# Patient Record
Sex: FEMALE | Race: WHITE | NOT HISPANIC OR LATINO
[De-identification: names, ages, dates, MRNs, and addresses within clinical notes are randomized per-mention and may not be internally consistent; named-entity substitution may affect disease eponyms.]

---

## 2019-09-20 ENCOUNTER — TRANSCRIPTION ENCOUNTER (OUTPATIENT)
Age: 49
End: 2019-09-20

## 2019-09-20 ENCOUNTER — APPOINTMENT (OUTPATIENT)
Dept: OTOLARYNGOLOGY | Facility: CLINIC | Age: 49
End: 2019-09-20
Payer: COMMERCIAL

## 2019-09-20 VITALS
HEIGHT: 63 IN | BODY MASS INDEX: 23.04 KG/M2 | SYSTOLIC BLOOD PRESSURE: 122 MMHG | WEIGHT: 130 LBS | DIASTOLIC BLOOD PRESSURE: 68 MMHG | HEART RATE: 59 BPM

## 2019-09-20 DIAGNOSIS — Z87.09 PERSONAL HISTORY OF OTHER DISEASES OF THE RESPIRATORY SYSTEM: ICD-10-CM

## 2019-09-20 DIAGNOSIS — Z91.09 OTHER ALLERGY STATUS, OTHER THAN TO DRUGS AND BIOLOGICAL SUBSTANCES: ICD-10-CM

## 2019-09-20 DIAGNOSIS — Z78.9 OTHER SPECIFIED HEALTH STATUS: ICD-10-CM

## 2019-09-20 PROBLEM — Z00.00 ENCOUNTER FOR PREVENTIVE HEALTH EXAMINATION: Status: ACTIVE | Noted: 2019-09-20

## 2019-09-20 PROCEDURE — 99213 OFFICE O/P EST LOW 20 MIN: CPT

## 2019-09-20 PROCEDURE — 92557 COMPREHENSIVE HEARING TEST: CPT

## 2019-09-20 PROCEDURE — 92567 TYMPANOMETRY: CPT

## 2019-09-20 RX ORDER — BUDESONIDE AND FORMOTEROL FUMARATE DIHYDRATE 160; 4.5 UG/1; UG/1
AEROSOL RESPIRATORY (INHALATION)
Refills: 0 | Status: ACTIVE | COMMUNITY

## 2019-09-20 NOTE — HISTORY OF PRESENT ILLNESS
[de-identified] : LU NELSON is a 48 year patient Right-sided tinnitus and hearing loss since September 17. She was walking on the street when she developed symptoms. The symptoms have not improved. She has no otalgia, otorrhea, or dizziness. She was not sick and does not have nasal congestion. She does have a headache. She has not been flying.  She denies a history of recurrent ear infections, prior otologic surgery, ear/head trauma, or noise exposure. She has TMJD and uses a nightguard. 
Yes

## 2019-09-20 NOTE — ASSESSMENT
[FreeTextEntry1] : She had a sudden right sensorineural hearing loss which occurred on September 17.\par \par PLAN\par \par -findings and management options discussed with the patient. \par - the possible etiologies of a sudden sensorineural hearing loss were reviewed with the patient. \par - Good aural hygiene.\par - Options for treatment were discussed in detail with the patient. The options are observation, oral steroids, and intratympanic steroid injection. The pros and cons of each were reviewed. Some of associated risks of oral steroids were reviewed with the patient including the risk of fractures, mood changes and GI complications.  She is going to try the oral steroids. I asked her to take the medication with food. I also asked her to consider taking Pepcid while on it to protect her stomach\par -I also discussed possible hyperbaric oxygen therapy which may be helpful per the recent guidelines. If she is interested in trying this since she is within the first 2 weeks of the hearing loss, she may. It is performed at [facility]\par - The patient will be sent for an MRI with contrast of the IACs to rule out retrocochlear lesion and other possible etiologies.  She denies risk factors for Lyme disease or syphilis.\par - Repeat audiogram in one week\par - follow up in 1 week. call and return earlier if any concerns\par

## 2019-09-26 ENCOUNTER — APPOINTMENT (OUTPATIENT)
Dept: OTOLARYNGOLOGY | Facility: CLINIC | Age: 49
End: 2019-09-26
Payer: COMMERCIAL

## 2019-09-26 PROCEDURE — 92557 COMPREHENSIVE HEARING TEST: CPT

## 2019-09-26 PROCEDURE — 99213 OFFICE O/P EST LOW 20 MIN: CPT

## 2019-09-26 PROCEDURE — 92550 TYMPANOMETRY & REFLEX THRESH: CPT

## 2019-09-26 NOTE — HISTORY OF PRESENT ILLNESS
[de-identified] : 9/20/19- LU NELSON is a 48 year patient Right-sided tinnitus and hearing loss since September 17. She was walking on the street when she developed symptoms. The symptoms have not improved. She has no otalgia, otorrhea, or dizziness. She was not sick and does not have nasal congestion. She does have a headache. She has not been flying.  She denies a history of recurrent ear infections, prior otologic surgery, ear/head trauma, or noise exposure. She has TMJD and uses a nightguard.  [FreeTextEntry1] : \par 9/26/19- LU NELSON is here for follow up for a sudden right sensorineural hearing loss which occurred on 9/17. She has been on oral steroids.  Her hearing has improved. The tinnitus is also better.  She has the MRI scheduled for tomorrow.

## 2019-09-26 NOTE — ASSESSMENT
[FreeTextEntry1] : She had a sudden sensorineural hearing loss which occurred on September 17. The hearing loss has resolved. Audiogram showed no asymmetry.\par \par PLAN\par \par -findings and management options discussed with the patient. \par - Good aural hygiene.\par - she will taper the oral steroids. \par - she is going to check with the hyperbaric oxygen facility to see if she should proceed with evaluation for therapy since her hearing has recovered. \par - MRI with contrast of the IACs to rule out retrocochlear lesion pending. she will call for the results. \par - Repeat audiogram in one month\par - follow up in 1 month. call and return earlier if any concerns\par

## 2019-09-27 ENCOUNTER — APPOINTMENT (OUTPATIENT)
Dept: MRI IMAGING | Facility: CLINIC | Age: 49
End: 2019-09-27

## 2019-10-03 ENCOUNTER — FORM ENCOUNTER (OUTPATIENT)
Age: 49
End: 2019-10-03

## 2019-10-04 ENCOUNTER — APPOINTMENT (OUTPATIENT)
Dept: MRI IMAGING | Facility: CLINIC | Age: 49
End: 2019-10-04
Payer: COMMERCIAL

## 2019-10-04 ENCOUNTER — OUTPATIENT (OUTPATIENT)
Dept: OUTPATIENT SERVICES | Facility: HOSPITAL | Age: 49
LOS: 1 days | End: 2019-10-04

## 2019-10-04 PROCEDURE — 70553 MRI BRAIN STEM W/O & W/DYE: CPT | Mod: 26

## 2019-10-17 ENCOUNTER — APPOINTMENT (OUTPATIENT)
Dept: NEUROSURGERY | Facility: CLINIC | Age: 49
End: 2019-10-17
Payer: COMMERCIAL

## 2019-10-17 VITALS
HEART RATE: 81 BPM | HEIGHT: 63 IN | WEIGHT: 130 LBS | DIASTOLIC BLOOD PRESSURE: 68 MMHG | OXYGEN SATURATION: 100 % | RESPIRATION RATE: 18 BRPM | SYSTOLIC BLOOD PRESSURE: 130 MMHG | BODY MASS INDEX: 23.04 KG/M2

## 2019-10-17 PROCEDURE — 99244 OFF/OP CNSLTJ NEW/EST MOD 40: CPT

## 2019-10-21 ENCOUNTER — APPOINTMENT (OUTPATIENT)
Dept: RADIATION ONCOLOGY | Facility: CLINIC | Age: 49
End: 2019-10-21
Payer: COMMERCIAL

## 2019-10-21 ENCOUNTER — APPOINTMENT (OUTPATIENT)
Dept: NEUROSURGERY | Facility: CLINIC | Age: 49
End: 2019-10-21
Payer: COMMERCIAL

## 2019-10-21 VITALS
SYSTOLIC BLOOD PRESSURE: 130 MMHG | HEART RATE: 60 BPM | BODY MASS INDEX: 23.74 KG/M2 | WEIGHT: 134 LBS | OXYGEN SATURATION: 100 % | RESPIRATION RATE: 18 BRPM | DIASTOLIC BLOOD PRESSURE: 72 MMHG

## 2019-10-21 VITALS
SYSTOLIC BLOOD PRESSURE: 104 MMHG | DIASTOLIC BLOOD PRESSURE: 70 MMHG | HEIGHT: 63 IN | OXYGEN SATURATION: 100 % | RESPIRATION RATE: 18 BRPM | HEART RATE: 68 BPM | BODY MASS INDEX: 23.04 KG/M2 | WEIGHT: 130 LBS

## 2019-10-21 PROCEDURE — 99215 OFFICE O/P EST HI 40 MIN: CPT

## 2019-10-21 PROCEDURE — 99204 OFFICE O/P NEW MOD 45 MIN: CPT | Mod: 25

## 2019-10-21 RX ORDER — PREDNISONE 10 MG/1
10 TABLET ORAL
Qty: 62 | Refills: 0 | Status: DISCONTINUED | COMMUNITY
Start: 2019-09-20 | End: 2019-10-21

## 2019-10-21 NOTE — REVIEW OF SYSTEMS
[Loss of Hearing] : loss of hearing [Hearing Disturbances] : hearing disturbances [Negative] : Respiratory [Tinnitus: Grade 1 - Mild symptoms; intervention not indicated] : Tinnitus: Grade 1 - Mild symptoms; intervention not indicated [FreeTextEntry4] : right hearing loss

## 2019-10-21 NOTE — PHYSICAL EXAM
[General Appearance - Alert] : alert [General Appearance - In No Acute Distress] : in no acute distress [Cranial Nerves Oculomotor (III)] : extraocular motion intact [Cranial Nerves Optic (II)] : visual acuity intact bilaterally,  pupils equal round and reactive to light [Cranial Nerves Trigeminal (V)] : facial sensation intact symmetrically [Cranial Nerves Facial (VII)] : face symmetrical [Cranial Nerves Vestibulocochlear (VIII)] : hearing was intact bilaterally [Cranial Nerves Glossopharyngeal (IX)] : tongue and palate midline [Cranial Nerves Hypoglossal (XII)] : there was no tongue deviation with protrusion [Cranial Nerves Accessory (XI - Cranial And Spinal)] : head turning and shoulder shrug symmetric [Motor Tone] : muscle tone was normal in all four extremities [Motor Strength] : muscle strength was normal in all four extremities [Sclera] : the sclera and conjunctiva were normal [Outer Ear] : the ears and nose were normal in appearance [Neck Appearance] : the appearance of the neck was normal [] : no respiratory distress [Respiration, Rhythm And Depth] : normal respiratory rhythm and effort [Heart Rate And Rhythm] : heart rate was normal and rhythm regular [Abnormal Walk] : normal gait [Skin Color & Pigmentation] : normal skin color and pigmentation

## 2019-10-21 NOTE — HISTORY OF PRESENT ILLNESS
[FreeTextEntry1] : Ms Hahn is a 48 year old woman who presents for consideration of radiation therapy for newly diagnosed RIGHT acoustic neuroma.\par \par Patient with no pertinent past medical history. She reports acute RIGHT sided hearing loss and tinnitus and was seen   9/17/19 by ENT Dr. Graciela Mendez. Audiology testing was done which demonstrated significant hearing loss in the RIGHT ear. She was subsequently placed on high dose steroids with interval improvement in hearing. She returned to see Dr. Mendez on 9/26/19, and another audiology test was administered which demonstrated improvement in hearing.\par \par MRI Brain and internal auditory canals  10/4/19 showed\par 1. Enhancing intracanalicular mass lesion in the right internal auditory canal consistent with an \par acoustic neuroma which measures up to 1.0 cm in length and extends to the porus acusticus with no \par extension into the right cerebellar pontine angle. \par 2. No brain parenchymal mass or acute infarct. \par 3. Right frontal lobe developmental venous anomaly.\par  \par She was seen by Dr Wang who referred her to Dr Simmons. She was seen today by Dr Simmons and recommended GKRT.\par Today, she reports occ HA and nausea, right ear ringing and hearing loss for low tones, vertigo with associated impaired balance when in a moving space. She denies visual impairment, pain, difficulty chewing/swallowing. \par

## 2019-10-21 NOTE — VITALS
[Least Pain Intensity: 0/10] : 0/10 [Maximal Pain Intensity: 0/10] : 0/10 [ECOG Performance Status: 0 - Fully active, able to carry on all pre-disease performance without restriction] : Performance Status: 0 - Fully active, able to carry on all pre-disease performance without restriction [90: Able to carry normal activity; minor signs or symptoms of disease.] : 90: Able to carry normal activity; minor signs or symptoms of disease.  [Date: ____________] : Patient's last distress assessment performed on [unfilled].

## 2019-10-21 NOTE — REASON FOR VISIT
[Other: ___] : [unfilled] [Spouse] : spouse [Consideration of Therapy for Benign Disease] : consideration of therapy for benign disease

## 2019-10-21 NOTE — REVIEW OF SYSTEMS
[Vertigo] : vertigo [Dizziness] : dizziness [As Noted in HPI] : as noted in HPI [Loss Of Hearing] : hearing loss [Negative] : Heme/Lymph

## 2019-10-28 ENCOUNTER — OUTPATIENT (OUTPATIENT)
Dept: OUTPATIENT SERVICES | Facility: HOSPITAL | Age: 49
LOS: 1 days | Discharge: HOME | End: 2019-10-28

## 2019-10-29 ENCOUNTER — APPOINTMENT (OUTPATIENT)
Dept: OTOLARYNGOLOGY | Facility: CLINIC | Age: 49
End: 2019-10-29
Payer: COMMERCIAL

## 2019-10-29 DIAGNOSIS — B37.0 CANDIDAL STOMATITIS: ICD-10-CM

## 2019-10-29 PROCEDURE — 99213 OFFICE O/P EST LOW 20 MIN: CPT

## 2019-10-29 NOTE — PHYSICAL EXAM
[General Appearance - Alert] : alert [General Appearance - In No Acute Distress] : in no acute distress [Oriented To Time, Place, And Person] : oriented to person, place, and time [I: Normal Smell] : smell intact bilaterally [Cranial Nerves Optic (II)] : visual acuity intact bilaterally,  pupils equal round and reactive to light [Cranial Nerves Oculomotor (III)] : extraocular motion intact [Cranial Nerves Trigeminal (V)] : facial sensation intact symmetrically [Cranial Nerves Facial (VII)] : face symmetrical [Cranial Nerves Glossopharyngeal (IX)] : tongue and palate midline [Cranial Nerves Accessory (XI - Cranial And Spinal)] : head turning and shoulder shrug symmetric [Cranial Nerves Hypoglossal (XII)] : there was no tongue deviation with protrusion [Sclera] : the sclera and conjunctiva were normal [Outer Ear] : the ears and nose were normal in appearance [] : no respiratory distress [Exaggerated Use Of Accessory Muscles For Inspiration] : no accessory muscle use [Heart Rate And Rhythm] : heart rate was normal and rhythm regular [Abdomen Soft] : soft [Abnormal Walk] : normal gait [Skin Color & Pigmentation] : normal skin color and pigmentation [FreeTextEntry5] : Right sided hearing decline

## 2019-10-29 NOTE — ASSESSMENT
[FreeTextEntry1] : Referral for Gamma Knife evaluation with Dr. Kieran Simmons.\par No open surgical options recommended at this time\par Education provided regarding plan of care.\par

## 2019-10-29 NOTE — ADDENDUM
[FreeTextEntry1] : October 17, 2019\par \par Graciela Mendez MD\par [address]\[place] [address]\par \par Re:	Ute Hahn \par \par Dear Dr. Mendez:\par \par I saw Ute and her  in the office today.  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  While the hearing has improved, some of her dizziness symptoms and just feeling of malaise have not entirely gotten better.  Ute really has no other major medical problems.  She currently maintains herself for asthma on Symbicort, Flonase, and Singulair.  She has had a kidney removed as a child at [facility], as well as spinal surgery, knee surgery, and a hernia repair.  She additionally has had her left knee operated on.  She drinks but does not smoke.  She is  with 2 young children.  Review of systems is notable for dizziness, lightheadedness, some memory difficulties, difficulty walking due to gait, numbness, tingling, and abnormal sensations.  She denies any cardiovascular, respiratory, or GI complaints.  She does report that her hearing has improved quite significantly.  She denies any right-sided facial weakness.\par \par Neurologically, Ute is grossly intact.  She has grossly normal hearing on the right side and has no evidence of facial paresis.  Her eyes movements are intact.  The remainder of her general neurosurgical exam is negative.  I had the opportunity to review her brain MRI from October 4, 2019, that shows an approximately 1 cm x 0.3 cm enhancing mass in the internal auditory canal that is purely within the canal and does not extend into the CP angle.  This is entirely consistent with a small vestibular schwannoma.\par \par I took the opportunity to introduce Ute to Dr. Kieran Simmons here at [facility].  I do think, given her transient hearing loss along with her MRI finding, that early treatment would likely be in her best interest to control the tumor growth and potentially preserve the hearing that she has left. While she could go on to lose hearing, I think the best chance of her preserving hearing in her ear would be to have early treatment albeit with the possibility that she could go on to lose it nonetheless.  I do think treatment is indicated, however.  I think this will give us the best chance of controlling the tumor and avoiding any kind of facial nerve weakness or surgical necessity down the road, and I told her so.  She met with Dr. Simmons briefly and will likely decide in the next few days to weeks whether to go ahead with further treatment.  \par \par I will certainly keep you abreast of her progress and appreciate your kind referral.\par \par Sincerely,\par \par \par \par Kalia Wang MD\par \par DL/ag [facility] #[id number]\par \par CC:	Kristian Raza MD\par 	[address] [place] [address]\[place] 	[place] [address]\par 	\par 	Kieran Simmons MD\par 	[facility]\par 	Department of Neurosurgery\par 	[address], [address] Floor, [facility]\[place] 	[place] [address]\par

## 2019-10-29 NOTE — REVIEW OF SYSTEMS
[Loss Of Hearing] : hearing loss [Negative] : Eyes [FreeTextEntry4] : RIGHT sided HEaring loss (50%)

## 2019-10-29 NOTE — ASSESSMENT
[FreeTextEntry1] : She had a sudden sensorineural hearing loss which occurred on September 17. The hearing loss resolved with steroids. She was found to have a right acoustic neuroma. She is scheduled for gamma knife radiosurgery.   she was found to have thrush on exam today. She does use an inhaler and is on Cipro.\par \par PLAN\par \par -findings and management options discussed with the patient. \par - good aural hygiene\par - monitor hearing- repeat audiogram in one month (earlier if recommended after the procedure)\par - nystatin oral rinses for one week.  she was told to rinse her mouth after using the inhaler. \par - follow up if the thrush does not resolve after treatment. otherwise, follow up in one month. call and return earlier if any concerns\par

## 2019-10-29 NOTE — HISTORY OF PRESENT ILLNESS
[de-identified] : 9/20/19- LU NELSON is a 48 year patient Right-sided tinnitus and hearing loss since September 17. She was walking on the street when she developed symptoms. The symptoms have not improved. She has no otalgia, otorrhea, or dizziness. She was not sick and does not have nasal congestion. She does have a headache. She has not been flying.  She denies a history of recurrent ear infections, prior otologic surgery, ear/head trauma, or noise exposure. She has TMJD and uses a nightguard.  [FreeTextEntry1] : \par 9/26/19- UTE NELSON is here for follow up for a sudden right sensorineural hearing loss which occurred on 9/17. She has been on oral steroids.  Her hearing has improved. The tinnitus is also better.  She has the MRI scheduled for tomorrow. \par \par 10/29/19- Ute Nelson Is here for followup. She had a right sudden sensorineural hearing loss which responded to oral steroids. Workup showed a small intracanicular right vestibular schwannoma. She saw Dr. Wang for neurosurgery evaluation. She was then referred to Dr. Simmons for gamma knife radiosurgery. She has the procedure scheduled for next week.  She said her hearing has been stable. She has mild tinnitus and dysequilibrium. she is on Cipro for UTI.

## 2019-10-29 NOTE — HISTORY OF PRESENT ILLNESS
[FreeTextEntry1] : RIGHT SIDED Hearing loss; Balance difficulty [de-identified] : This is a 48 year old woman here for Comprehensive assessment of Vestibular Schwannoma. She comes to visit with her . She reports thar her symptoms started suddenly on 9/17/19 where she experienced complete right sided hearing loss. She followed up with her ENT Dr. Shahnaz Mendez and was placed on high dose steroids. MRI workup shows vestibular schwanomma. She denies any other neurological deficits

## 2019-10-31 NOTE — PLAN
[FreeTextEntry1] : MRI brain reviewed in detail by Dr. Simmons with patient and patient's , demonstrates RIGHT acoustic neuroma.\par Gamma Knife Radiosurgery recommended. Risks, alternatives and benefits to Gamma Knife Radiosurgery discussed. Risks include but are not limited to cerebral edema, radionecrosis, seizures, continued tumor growth. \par Explained procedure to patient in detail including head frame placement, new MRI the day of procedure, radiation treatment, and post-procedure care. \par Radiation treatment will take place at [address] [place] [address]. Directions and contact information provided to patient.\par Education packet regarding Gamma Knife Radiosurgery provided.\par Multiple questions answered to patient's satisfaction.\par \par Patient understands and wishes to proceed.\par \par She is scheduled for 11/5/19 for GKRS.\par \par Patient and patient's  verbalize agreement and understanding with plan.\par \par \par

## 2019-10-31 NOTE — ADDENDUM
[FreeTextEntry1] : 2019 \par  \par OFFICE CONSULTATION NOTE \par  \par  \par Re:	Ute Hahn  \par :	74 \par MRN:  [id number] \par  \par Ms. Hahn is a 48-year-old woman who presents for consultation regarding a right side vestibular schwannoma. This was discovered in workup for sudden onset hearing loss in September of this year. In addition to the hearing loss, she also had onset of tinnitus, as well as vertigo and nausea. MRI workup for these symptoms disclosed an approximately 1 cm enhancing lesion within the right internal auditory canal consistent with vestibular schwannoma. Ms. Hahn was placed on high dose steroids and some improvement in her hearing on the right was noted. Audiogram demonstrates sensorineural hearing loss on the right side. Aside from that, on neurological exam she is completely nonfocal. \par  \par Strategies for management, including conservative watchful waiting versus Gamma Knife radiosurgery versus microsurgical resection were discussed. Given the symptomatic nature of the vestibular schwannoma, I have recommended she consider treatment with Gamma Knife radiosurgery. Certainly a conservative approach is also reasonable if she were uncomfortable with the treatment risks at this point. I do not recommend microsurgical resection as Gamma Knife can be more easily performed with less morbidity. The goal of Gamma Knife radiosurgery would be to halt or slow tumor progression, and possibly to also halt the progression of hearing loss. Risks of Gamma Knife radiosurgery including progressive hearing loss, continued tumor progression, worsening vestibular symptoms, need for retreatment and continued tumor growth were discussed. Treatment which would be in a single fraction using a headframe was discussed in detail.  \par  \par  \par  \par  \par  \par  \par  \par Ms. Hahn favors active treatment at this point. I think this is reasonable. Will plan to proceed with Gamma Knife in the coming weeks. \par  \par  \par  \par  \par  \par Kieran Simmons M.D. \par  of Neurosurgery \par [facility] \[facility] \par [facility] \par [address] \par [facility], Third Floor \[place] [address] \par Tel: [phone] \par Email:  [email or web address] \par  \par  \par ELSA/francy [facility] #[id number] \par  \par  \par

## 2019-10-31 NOTE — REASON FOR VISIT
[Consultation] : a consultation visit [Referred By: _________] : Patient was referred by JAVIER [Spouse] : spouse [FreeTextEntry1] : RIGHT acoustic neuroma

## 2019-11-01 ENCOUNTER — OUTPATIENT (OUTPATIENT)
Dept: OUTPATIENT SERVICES | Facility: HOSPITAL | Age: 49
LOS: 1 days | Discharge: HOME | End: 2019-11-01
Payer: COMMERCIAL

## 2019-11-04 ENCOUNTER — FORM ENCOUNTER (OUTPATIENT)
Age: 49
End: 2019-11-04

## 2019-11-05 ENCOUNTER — APPOINTMENT (OUTPATIENT)
Dept: RADIATION ONCOLOGY | Facility: CLINIC | Age: 49
End: 2019-11-05
Payer: COMMERCIAL

## 2019-11-05 ENCOUNTER — APPOINTMENT (OUTPATIENT)
Dept: MRI IMAGING | Facility: IMAGING CENTER | Age: 49
End: 2019-11-05

## 2019-11-05 ENCOUNTER — OUTPATIENT (OUTPATIENT)
Dept: OUTPATIENT SERVICES | Facility: HOSPITAL | Age: 49
LOS: 1 days | End: 2019-11-05
Payer: COMMERCIAL

## 2019-11-05 ENCOUNTER — CLINICAL ADVICE (OUTPATIENT)
Age: 49
End: 2019-11-05

## 2019-11-05 ENCOUNTER — APPOINTMENT (OUTPATIENT)
Dept: NEUROSURGERY | Facility: AMBULATORY SURGERY CENTER | Age: 49
End: 2019-11-05
Payer: COMMERCIAL

## 2019-11-05 DIAGNOSIS — D33.3 BENIGN NEOPLASM OF CRANIAL NERVES: ICD-10-CM

## 2019-11-05 PROCEDURE — 76498 UNLISTED MR PROCEDURE: CPT

## 2019-11-05 PROCEDURE — 77432 STEREOTACTIC RADIATION TRMT: CPT

## 2019-11-05 PROCEDURE — 77334 RADIATION TREATMENT AID(S): CPT | Mod: 26

## 2019-11-05 PROCEDURE — 77300 RADIATION THERAPY DOSE PLAN: CPT | Mod: 26

## 2019-11-05 PROCEDURE — 61800 APPLY SRS HEADFRAME ADD-ON: CPT

## 2019-11-05 PROCEDURE — 61798 SRS CRANIAL LESION COMPLEX: CPT

## 2019-11-05 PROCEDURE — 77295 3-D RADIOTHERAPY PLAN: CPT | Mod: 26

## 2019-11-05 PROCEDURE — 99214 OFFICE O/P EST MOD 30 MIN: CPT | Mod: 25

## 2019-11-05 PROCEDURE — A9585: CPT

## 2019-11-05 RX ORDER — MONTELUKAST SODIUM 10 MG/1
10 TABLET, FILM COATED ORAL
Refills: 0 | Status: ACTIVE | COMMUNITY
Start: 2019-11-05

## 2019-11-05 RX ORDER — NYSTATIN 100000 [USP'U]/ML
100000 SUSPENSION ORAL 3 TIMES DAILY
Qty: 105 | Refills: 1 | Status: DISCONTINUED | COMMUNITY
Start: 2019-10-29 | End: 2019-11-05

## 2019-11-05 NOTE — HISTORY OF PRESENT ILLNESS
[FreeTextEntry1] : Ms. Almeida presents today for consideration for radiation therapy. \par \par She notes right sided hearing loss and tinnitus starting in 9/2018. hearing tests showed significant right sided hearing loss, which improved on steroids.\par \par MRI brain IAC done 10/4/19 an enhancing intracanalicular mass lesion in the right IAC consistent with an acoustic neuroma which measures up to 1 cm in length and extend to the porous acusticus with no extension into the right CPA. \par \par Today she notes she has some hearing loss in the right ear, though it is mostly back to normal. She notices trouble with low sounds. Has ringing to the right ear. Notes intermittent facial numbness to the right V1 and V2 distribution. Denies facial weakness. She has an intermittent feeling of nausea and motion sickness.

## 2019-11-05 NOTE — REVIEW OF SYSTEMS
[Loss of Hearing] : loss of hearing [Dizziness] : dizziness [Negative] : Heme/Lymph [Fever] : no fever [Chills] : no chills [Night Sweats] : no night sweats [Dysphagia] : no dysphagia [FreeTextEntry4] : right sided low tone hearing loss [de-identified] : intermittent feeling of numbness to right side of face

## 2019-11-07 DIAGNOSIS — K21.9 GASTRO-ESOPHAGEAL REFLUX DISEASE W/OUT ESOPHAGITIS: ICD-10-CM

## 2019-11-11 ENCOUNTER — APPOINTMENT (OUTPATIENT)
Dept: NEUROSURGERY | Facility: CLINIC | Age: 49
End: 2019-11-11
Payer: COMMERCIAL

## 2019-11-11 VITALS
WEIGHT: 134 LBS | HEART RATE: 83 BPM | RESPIRATION RATE: 18 BRPM | BODY MASS INDEX: 23.74 KG/M2 | OXYGEN SATURATION: 97 % | HEIGHT: 63 IN | DIASTOLIC BLOOD PRESSURE: 76 MMHG | SYSTOLIC BLOOD PRESSURE: 133 MMHG

## 2019-11-11 PROCEDURE — 99024 POSTOP FOLLOW-UP VISIT: CPT

## 2019-11-11 NOTE — HISTORY OF PRESENT ILLNESS
[de-identified] : 48 year old woman with no pertinent past medical history referred by Dr. Kalia Wang for consideration of GKRS to RIGHT acoustic neuroma.\par \par She reports acute RIGHT sided hearing loss on 9/17/19 followed by persistent RIGHT ear tinnitus. She saw ENT, Dr. Graciela Mendez. Audiology testing was done which demonstrated significant hearing loss in the RIGHT ear.  She was subsequently placed on high dose steroids with interval improvement in hearing. She returned to see Dr. Mendez on 9/26/19, and another audiology test was administered which demonstrated improvement in hearing.\par \par Since this episode of acute hearing loss, Ms. Hahn also reports intermittent episodes of vertigo, dizziness and nausea as well as RIGHT sided posterior head "pressure". She states she continues to have persistent RIGHT ear tinnitus, which varies in intensity but feels her RIGHT sided hearing is markedly improved. Denies decreased hearing/tinnitus in LEFT ear.

## 2019-11-11 NOTE — REASON FOR VISIT
[Follow-Up: _____] : a [unfilled] follow-up visit [Spouse] : spouse [FreeTextEntry1] : She comes to the appointment with her .\par \par She reports worse tinnitus beginning last week in the RIGHT ear as well as worsening imbalance. She was started on dexamethasone taper and she states that she does feel better but does continue to have balance trouble.\par \par She denies focal weakness, changes in speech/vision, nausea/vomiting.\par She reports sleep disturbance with increase in steroids.

## 2019-11-11 NOTE — PHYSICAL EXAM
[General Appearance - Alert] : alert [General Appearance - In No Acute Distress] : in no acute distress [Healing Well] : healing well [Oriented To Time, Place, And Person] : oriented to person, place, and time [Motor Tone] : muscle tone was normal in all four extremities [Motor Strength] : muscle strength was normal in all four extremities [Sclera] : the sclera and conjunctiva were normal [Outer Ear] : the ears and nose were normal in appearance [Neck Appearance] : the appearance of the neck was normal [] : no respiratory distress [Respiration, Rhythm And Depth] : normal respiratory rhythm and effort [Heart Rate And Rhythm] : heart rate was normal and rhythm regular [Abnormal Walk] : normal gait [Skin Color & Pigmentation] : normal skin color and pigmentation [FreeTextEntry1] : pin sites

## 2019-12-05 ENCOUNTER — APPOINTMENT (OUTPATIENT)
Dept: OTOLARYNGOLOGY | Facility: CLINIC | Age: 49
End: 2019-12-05
Payer: COMMERCIAL

## 2019-12-05 PROCEDURE — 92557 COMPREHENSIVE HEARING TEST: CPT

## 2019-12-05 PROCEDURE — 92567 TYMPANOMETRY: CPT

## 2019-12-05 PROCEDURE — 99213 OFFICE O/P EST LOW 20 MIN: CPT

## 2019-12-05 RX ORDER — DEXAMETHASONE 6 MG/1
6 TABLET ORAL
Qty: 40 | Refills: 0 | Status: DISCONTINUED | COMMUNITY
Start: 2019-11-07 | End: 2019-12-05

## 2019-12-05 RX ORDER — OMEPRAZOLE 20 MG/1
20 CAPSULE, DELAYED RELEASE ORAL DAILY
Qty: 30 | Refills: 0 | Status: DISCONTINUED | COMMUNITY
Start: 2019-11-07 | End: 2019-12-05

## 2019-12-05 RX ORDER — PREDNISONE 10 MG/1
10 TABLET ORAL
Qty: 30 | Refills: 0 | Status: DISCONTINUED | COMMUNITY
Start: 2019-11-05 | End: 2019-12-05

## 2019-12-05 RX ORDER — DEXAMETHASONE 2 MG/1
2 TABLET ORAL
Qty: 12 | Refills: 0 | Status: DISCONTINUED | COMMUNITY
Start: 2019-11-12 | End: 2019-12-05

## 2019-12-05 NOTE — HISTORY OF PRESENT ILLNESS
[de-identified] :  LU NELSON is a 48 year patient here for followup for a right acoustic neuroma. She developed a sudden sensorineural hearing loss with tinnitus and dizziness on September 17. The hearing loss responded to oral steroids. She underwent gamma knife radiosurgery for the acoustic neuroma on November 5. She said that she had a lot of problems with her balance and dizziness following the procedure. It has been slowly improving. She still has mild fullness and tinnitus in the ear. She is here to check her hearing.

## 2019-12-05 NOTE — ASSESSMENT
[FreeTextEntry1] : She is doing well following gamma knife radiosurgery for a right acoustic normal. She has some issues with her balance and dizziness but it is slowly improving. Her hearing is stable. \par \par PLAN\par \par -findings and management options discussed with the patient. \par - good aural hygiene\par - noise precautions\par - monitor hearing\par - she may try vestibular therapy\par - follow up in 3 months to check her hearing. Return earlier if any worsening symptoms or any concerns. \par

## 2019-12-09 ENCOUNTER — APPOINTMENT (OUTPATIENT)
Dept: NEUROSURGERY | Facility: CLINIC | Age: 49
End: 2019-12-09
Payer: COMMERCIAL

## 2019-12-09 VITALS
SYSTOLIC BLOOD PRESSURE: 107 MMHG | OXYGEN SATURATION: 99 % | HEART RATE: 75 BPM | HEIGHT: 63 IN | BODY MASS INDEX: 23.74 KG/M2 | DIASTOLIC BLOOD PRESSURE: 70 MMHG | WEIGHT: 134 LBS | TEMPERATURE: 98.5 F | RESPIRATION RATE: 18 BRPM

## 2019-12-09 PROCEDURE — 99024 POSTOP FOLLOW-UP VISIT: CPT

## 2019-12-11 NOTE — HISTORY OF PRESENT ILLNESS
[de-identified] : 48 year old woman with no pertinent past medical history referred by Dr. Kalia Wang for consideration of GKRS to RIGHT acoustic neuroma.\par \par She reports acute RIGHT sided hearing loss on 9/17/19 followed by persistent RIGHT ear tinnitus. She saw ENT, Dr. Graciela Mendez. Audiology testing was done which demonstrated significant hearing loss in the RIGHT ear.  She was subsequently placed on high dose steroids with interval improvement in hearing. She returned to see Dr. Mendez on 9/26/19, and another audiology test was administered which demonstrated improvement in hearing.\par \par Since this episode of acute hearing loss, Ms. Hahn also reports intermittent episodes of vertigo, dizziness and nausea as well as RIGHT sided posterior head "pressure". She states she continues to have persistent RIGHT ear tinnitus, which varies in intensity but feels her RIGHT sided hearing is markedly improved. Denies decreased hearing/tinnitus in LEFT ear.

## 2019-12-11 NOTE — ASSESSMENT
[FreeTextEntry1] : Plan:\par - Patient is to follow-up in 3 months.\par - Patient is to obtain a new brain MRI in 6 months (June 2020).\par \par Patient verbalizes understanding and agreement with plan.

## 2020-01-22 ENCOUNTER — EMERGENCY (EMERGENCY)
Facility: HOSPITAL | Age: 50
LOS: 1 days | Discharge: HOME | End: 2020-01-22
Attending: EMERGENCY MEDICINE | Admitting: EMERGENCY MEDICINE
Payer: COMMERCIAL

## 2020-01-22 VITALS
HEART RATE: 65 BPM | SYSTOLIC BLOOD PRESSURE: 122 MMHG | WEIGHT: 134.04 LBS | HEIGHT: 63 IN | OXYGEN SATURATION: 100 % | DIASTOLIC BLOOD PRESSURE: 71 MMHG | TEMPERATURE: 98 F | RESPIRATION RATE: 18 BRPM

## 2020-01-22 VITALS
TEMPERATURE: 99 F | DIASTOLIC BLOOD PRESSURE: 67 MMHG | HEART RATE: 72 BPM | SYSTOLIC BLOOD PRESSURE: 114 MMHG | OXYGEN SATURATION: 100 % | RESPIRATION RATE: 18 BRPM

## 2020-01-22 PROCEDURE — 96375 TX/PRO/DX INJ NEW DRUG ADDON: CPT

## 2020-01-22 PROCEDURE — 80053 COMPREHEN METABOLIC PANEL: CPT

## 2020-01-22 PROCEDURE — 85025 COMPLETE CBC W/AUTO DIFF WBC: CPT

## 2020-01-22 PROCEDURE — 99284 EMERGENCY DEPT VISIT MOD MDM: CPT | Mod: 25

## 2020-01-22 PROCEDURE — 96372 THER/PROPH/DIAG INJ SC/IM: CPT | Mod: XU

## 2020-01-22 PROCEDURE — 70450 CT HEAD/BRAIN W/O DYE: CPT | Mod: 26

## 2020-01-22 PROCEDURE — 70450 CT HEAD/BRAIN W/O DYE: CPT

## 2020-01-22 PROCEDURE — 99284 EMERGENCY DEPT VISIT MOD MDM: CPT

## 2020-01-22 PROCEDURE — 96361 HYDRATE IV INFUSION ADD-ON: CPT

## 2020-01-22 PROCEDURE — 36415 COLL VENOUS BLD VENIPUNCTURE: CPT

## 2020-01-22 PROCEDURE — 99241 OFFICE CONSULTATION NEW/ESTAB PATIENT 15 MIN: CPT

## 2020-01-22 PROCEDURE — 96374 THER/PROPH/DIAG INJ IV PUSH: CPT

## 2020-01-22 RX ORDER — SODIUM CHLORIDE 9 MG/ML
1000 INJECTION INTRAMUSCULAR; INTRAVENOUS; SUBCUTANEOUS ONCE
Refills: 0 | Status: COMPLETED | OUTPATIENT
Start: 2020-01-22 | End: 2020-01-22

## 2020-01-22 RX ORDER — DEXAMETHASONE 0.5 MG/5ML
0 ELIXIR ORAL
Qty: 11 | Refills: 0
Start: 2020-01-22

## 2020-01-22 RX ORDER — SUMATRIPTAN SUCCINATE 4 MG/.5ML
6 INJECTION, SOLUTION SUBCUTANEOUS ONCE
Refills: 0 | Status: COMPLETED | OUTPATIENT
Start: 2020-01-22 | End: 2020-01-22

## 2020-01-22 RX ORDER — DEXAMETHASONE 0.5 MG/5ML
10 ELIXIR ORAL ONCE
Refills: 0 | Status: COMPLETED | OUTPATIENT
Start: 2020-01-22 | End: 2020-01-22

## 2020-01-22 RX ORDER — FAMOTIDINE 10 MG/ML
20 INJECTION INTRAVENOUS ONCE
Refills: 0 | Status: COMPLETED | OUTPATIENT
Start: 2020-01-22 | End: 2020-01-22

## 2020-01-22 RX ORDER — METOCLOPRAMIDE HCL 10 MG
10 TABLET ORAL ONCE
Refills: 0 | Status: COMPLETED | OUTPATIENT
Start: 2020-01-22 | End: 2020-01-22

## 2020-01-22 RX ORDER — KETOROLAC TROMETHAMINE 30 MG/ML
30 SYRINGE (ML) INJECTION ONCE
Refills: 0 | Status: DISCONTINUED | OUTPATIENT
Start: 2020-01-22 | End: 2020-01-22

## 2020-01-22 RX ADMIN — SODIUM CHLORIDE 1000 MILLILITER(S): 9 INJECTION INTRAMUSCULAR; INTRAVENOUS; SUBCUTANEOUS at 15:46

## 2020-01-22 RX ADMIN — Medication 104 MILLIGRAM(S): at 13:50

## 2020-01-22 RX ADMIN — SUMATRIPTAN SUCCINATE 6 MILLIGRAM(S): 4 INJECTION, SOLUTION SUBCUTANEOUS at 14:18

## 2020-01-22 RX ADMIN — SODIUM CHLORIDE 1000 MILLILITER(S): 9 INJECTION INTRAMUSCULAR; INTRAVENOUS; SUBCUTANEOUS at 14:18

## 2020-01-22 RX ADMIN — SODIUM CHLORIDE 1000 MILLILITER(S): 9 INJECTION INTRAMUSCULAR; INTRAVENOUS; SUBCUTANEOUS at 14:46

## 2020-01-22 RX ADMIN — FAMOTIDINE 20 MILLIGRAM(S): 10 INJECTION INTRAVENOUS at 16:39

## 2020-01-22 RX ADMIN — Medication 102 MILLIGRAM(S): at 16:35

## 2020-01-22 RX ADMIN — Medication 10 MILLIGRAM(S): at 13:50

## 2020-01-22 RX ADMIN — SUMATRIPTAN SUCCINATE 6 MILLIGRAM(S): 4 INJECTION, SOLUTION SUBCUTANEOUS at 15:00

## 2020-01-22 RX ADMIN — SODIUM CHLORIDE 1000 MILLILITER(S): 9 INJECTION INTRAMUSCULAR; INTRAVENOUS; SUBCUTANEOUS at 14:45

## 2020-01-22 NOTE — ED PROVIDER NOTE - OBJECTIVE STATEMENT
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 Denies neck stiffness, fever, congestion, numbness, weakness, slurry speech. No menstrual period after gamma knife until last week which ended 2 days ago. Pt is not pregnant. Pt has chronic intermittent vertigo which is not new or worse than usual w/ balance difficulty.

## 2020-01-22 NOTE — ED ADULT NURSE NOTE - CHPI ED NUR SYMPTOMS NEG
no vomiting/no nausea/no numbness/no dizziness/no fever/no weakness/no loss of consciousness/no change in level of consciousness/no confusion

## 2020-01-22 NOTE — ED PROVIDER NOTE - PROGRESS NOTE DETAILS
Decadron IV and then oral taper advised by neurosurgery, will f/u on Fri, discussed emergent return instructions

## 2020-01-22 NOTE — CONSULT NOTE ADULT - SUBJECTIVE AND OBJECTIVE BOX
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 Denies neck stiffness, fever, congestion, numbness, weakness, slurry speech. No menstrual period after gamma knife until last week which ended 2 days ago. Pt is not pregnant. Pt has chronic intermittent vertigo which is not new or worse than usual w/ balance difficulty.  PMH: Acoustic neuroma  Exam: A&O x 3, PERRL, EOMI. CN II to XII are grossly intact.  No pronator drift.  Lung: clear lung sound  Heart: S1S2, regular   Ext: No edma  No focal motor deficit. 5/5 x 4  No sensory deficit to touch.    Head CT: FINDINGS:    VENTRICLES AND SULCI:  Parenchymal volume and sulci are appropriate for the patient's age.  INTRA-AXIAL: No acute intracranial hemorrhage or midline shift is present. The gray-white matter differentiation is preserved.  EXTRA-AXIAL: No extra-axial fluid collection is present. No large or compressive mass lesion is noted in the posterior fossa. Known right IAC schwannoma cannot be seen on head CT.  VISUALIZED SINUSES: The visualized paranasal sinuses are clear.  VISUALIZED MASTOIDS:  Clear.  CALVARIUM:  Normal.  Other: There is asymmetric sclerosis of the left mandibular condylar head which is likely secondary to osteoarthritis as opposed to avascular necrosis.    IMPRESSION:    No acute intracranial hemorrhage or mass effect.  ASSESSMENT: 49 female with headache post Gamma Knife treatment.  Plan: Patient may be discharged home to follow up with Dr. Simmons. She already has an appointment.  - Give 10mg of decadron IV now, then discharge her with decadron taper x 1 week.  Case discussed with Dr. Simmons who reviewed  images and formulated plan.

## 2020-01-22 NOTE — ED PROVIDER NOTE - NEUROLOGICAL, MLM
Alert and oriented, no focal deficits, no motor or sensory deficits. Alert and oriented, no focal deficits, no motor or sensory deficits. CN'II-XII intact, no pronator drift, nl finger to nose, clear speech, gait intact

## 2020-01-22 NOTE — ED PROVIDER NOTE - CLINICAL SUMMARY MEDICAL DECISION MAKING FREE TEXT BOX
48 y/o F with new HA consider recurrent acoustic neuroma. Consider ICH at surgical site but less likely. Suspect more likely migraines secondary to recent hormonal fluctuation as return of menstrual period. Will plan for labs, CT, migraine treatment. Discuses w/ neuro surgery.

## 2020-01-22 NOTE — ED ADULT NURSE NOTE - OBJECTIVE STATEMENT
Pt presents complaining of right sided headache with blurry vision and right sided hearing decrease since wednesday. Pt reports hx of brain mass treated with gamma knife in novmember with initial improvement of symptoms and plan for follow up mri in march. Pt reports tumor was effecting facial nerves and hearing, states these symptoms were improving until wednesday. Pt unable to hear rubbing of fingers next to right ear. Pt reports MD Simmons referred her to [facility] ED for CT scan.

## 2020-01-22 NOTE — ED ADULT TRIAGE NOTE - CHIEF COMPLAINT QUOTE
Patient c/o constant headache mostly on rt side associated with Blurry vision since wednesday . Denies any nausea nor vomiting . History of brain tumor .

## 2020-01-22 NOTE — ED PROVIDER NOTE - PATIENT PORTAL LINK FT
You can access the FollowMyHealth Patient Portal offered by [facility] by registering at the following website: [email or web address]. By joining [facility]’s FollowMyHealth portal, you will also be able to view your health information using other applications (apps) compatible with our system.

## 2020-01-23 ENCOUNTER — APPOINTMENT (OUTPATIENT)
Dept: OTOLARYNGOLOGY | Facility: CLINIC | Age: 50
End: 2020-01-23
Payer: COMMERCIAL

## 2020-01-23 DIAGNOSIS — H92.01 OTALGIA, RIGHT EAR: ICD-10-CM

## 2020-01-23 DIAGNOSIS — H93.11 TINNITUS, RIGHT EAR: ICD-10-CM

## 2020-01-23 DIAGNOSIS — H91.21 SUDDEN IDIOPATHIC HEARING LOSS, RIGHT EAR: ICD-10-CM

## 2020-01-23 PROCEDURE — 99213 OFFICE O/P EST LOW 20 MIN: CPT

## 2020-01-23 NOTE — HISTORY OF PRESENT ILLNESS
[de-identified] : 12/5/19- LU NELSON is a 48 year patient here for followup for a right acoustic neuroma. She developed a sudden sensorineural hearing loss with tinnitus and dizziness on September 17. The hearing loss responded to oral steroids. She underwent gamma knife radiosurgery for the acoustic neuroma on November 5. She said that she had a lot of problems with her balance and dizziness following the procedure. It has been slowly improving. She still has mild fullness and tinnitus in the ear. She is here to check her hearing.\par  [FreeTextEntry1] : \par 1/23/20- Ute Is here for right ear and jaw pain. She said that she felt significant sharp right ear and jaw pain. She then developed a right-sided headache. She has had worsening tinnitus in the right ear. She continues to suffer from dizziness/disequilibrium. She spoke with Dr. Simmons and went to the emergency room. She had CT scan of the head which was unremarkable. She said she did have a recent sinus infection. I reviewed the films. The sinuses were clear on the exam. She has been on oral steroids for the past day. The pain is improving. She was also given another medication in the ER. She does have a history of TMJ dysfunction and uses a .

## 2020-01-23 NOTE — CONSULT LETTER
[Courtesy Letter:] : I had the pleasure of seeing your patient, [unfilled], in my office today. [Dear  ___] : Dear  [unfilled], [Consult Closing:] : Thank you very much for allowing me to participate in the care of this patient.  If you have any questions, please do not hesitate to contact me. [Please see my note below.] : Please see my note below. [FreeTextEntry3] : Graciela Mendez MD\par  [Sincerely,] : Sincerely,

## 2020-01-23 NOTE — ASSESSMENT
[FreeTextEntry1] : She has had right ear and jaw pain. Her ear was normal on exam. She had a CT scan recently as well. There was no sinus inflammation. We discussed possible etiologies of pain. She is going to see Dr. Simmons for evaluation to see this related to her recent treatment for the acoustic neuroma. She is feeling better on the steroids. She also has TMJ dysfunction. It could also cause ear pain as well.\par \par She has dizziness and disequilibrium\par \par PLAN\par \par -findings and management options discussed with the patient. \par - good aural hygiene\par - noise precautions\par - monitor hearing- she declined an audiogram today\par - she may try vestibular therapy\par - continue with treatment for TMJD\par - She will see Dr. Simmons for evaluation for the headache\par - follow up next month as scheduled to check her hearing. She should return earlier if any worsening symptoms or any concerns. \par

## 2020-01-24 ENCOUNTER — APPOINTMENT (OUTPATIENT)
Dept: NEUROSURGERY | Facility: CLINIC | Age: 50
End: 2020-01-24
Payer: COMMERCIAL

## 2020-01-24 VITALS
BODY MASS INDEX: 23.74 KG/M2 | RESPIRATION RATE: 18 BRPM | SYSTOLIC BLOOD PRESSURE: 123 MMHG | HEIGHT: 63 IN | WEIGHT: 134 LBS | OXYGEN SATURATION: 99 % | HEART RATE: 58 BPM | DIASTOLIC BLOOD PRESSURE: 75 MMHG

## 2020-01-24 PROCEDURE — 99024 POSTOP FOLLOW-UP VISIT: CPT

## 2020-01-25 PROBLEM — D33.3 BENIGN NEOPLASM OF CRANIAL NERVES: Chronic | Status: ACTIVE | Noted: 2020-01-22

## 2020-01-30 DIAGNOSIS — R51 HEADACHE: ICD-10-CM

## 2020-01-30 DIAGNOSIS — G43.909 MIGRAINE, UNSPECIFIED, NOT INTRACTABLE, WITHOUT STATUS MIGRAINOSUS: ICD-10-CM

## 2020-02-04 NOTE — PHYSICAL EXAM
[General Appearance - In No Acute Distress] : in no acute distress [Oriented To Time, Place, And Person] : oriented to person, place, and time [General Appearance - Alert] : alert [Cranial Nerves Optic (II)] : visual acuity intact bilaterally,  pupils equal round and reactive to light [Cranial Nerves Trigeminal (V)] : facial sensation intact symmetrically [Cranial Nerves Facial (VII)] : face symmetrical [Cranial Nerves Oculomotor (III)] : extraocular motion intact [Cranial Nerves Glossopharyngeal (IX)] : tongue and palate midline [Cranial Nerves Accessory (XI - Cranial And Spinal)] : head turning and shoulder shrug symmetric [Motor Tone] : muscle tone was normal in all four extremities [Motor Strength] : muscle strength was normal in all four extremities [Cranial Nerves Hypoglossal (XII)] : there was no tongue deviation with protrusion [Sclera] : the sclera and conjunctiva were normal [] : no respiratory distress [Outer Ear] : the ears and nose were normal in appearance [Neck Appearance] : the appearance of the neck was normal [Respiration, Rhythm And Depth] : normal respiratory rhythm and effort [Abnormal Walk] : normal gait [Skin Color & Pigmentation] : normal skin color and pigmentation [FreeTextEntry5] : decreased hearing in RIGHT ear

## 2020-02-04 NOTE — ADDENDUM
[FreeTextEntry1] : 2020 \par  \par OFFICE FOLLOWUP NOTE \par  \par  \par Re:	Ute Hahn  \par :	74 \par MRN:  [id number] \par  \par Ms. Hahn is a 49-year-old woman who underwent Gamma Knife radiosurgery on 2019 for a right side intracanalicular vestibular schwannoma. She comes in today for followup after presenting to the ED last week with severe headaches, as well as increased right sided tinnitus and face pain. I reviewed her head CT scan from the ER visit and it is unremarkable, no pathology noted. Today she tells me that she has been on a dexamethasone steroid taper and it has been helping to alleviate most of her symptoms. She feels that she is definitely getting better. She visited with her ENT doctor yesterday who indicates that there is no identifiable pathology on which to act upon at this time.  \par  \par At this point I would like her to continue with the dexamethasone taper. I have recommended that she consider vestibular rehab/physical therapy, and I would like to see her again in one month. I reassured her that the transient symptoms that she finds occurring can certainly be seen after radiosurgery to intracanalicular vestibular schwannomas such as hers, and I suspect it may be at least partially due to irritation within the inner ear. She will alert me if there are any new or persistent issues. Otherwise I will see her again in one month. \par  \par  \par  \par  \par Kieran Simmons M.D. \[facility] \[facility] \[facility] \[facility] \par [address] \[facility], Third [place] \[place] [address] \[facility] Tel: [phone] \[facility] Email:  [email or web address] \par  \par  \par ELSA/francy [facility] #[id number] \par  \[facility]  \[facility]

## 2020-02-04 NOTE — REASON FOR VISIT
[Spouse] : spouse [Follow-Up: _____] : a [unfilled] follow-up visit [FreeTextEntry1] : \par TODAY'S VISIT:\par Following up today due to new symptoms - intermittent dizziness, RIGHT sided pressure like headache and imbalance.\casa Was seen in ED for headache and CT head was done which was negative for acute abnormality.\par At today's visit, she states headache has resolved but reports persistent ear fullness in the RIGHT, dizziness, imbalance. She has been able to return to work.\par She is currently taking 6 mg dexamethasone daily.\par She saw ENT yesterday, Dr. Mendez and was recommended vestibular therapy.\par \par PREVIOUS OFFICE VISIT 12/9/19:\par Returns today to evaluate progress.\par She finished steroid taper and symptoms have significantly improved. She has returned to work. \par \par PREVIOUS OFFICE VISIT 11/11/19:\par She comes to the appointment with her .\par \par She reports worse tinnitus beginning last week in the RIGHT ear as well as worsening imbalance. She was started on dexamethasone taper and she states that she does feel better but does continue to have balance trouble.\par \par She denies focal weakness, changes in speech/vision, nausea/vomiting.\par She reports sleep disturbance with increase in steroids.

## 2020-02-04 NOTE — ASSESSMENT
[FreeTextEntry1] : CT head done in ED reviewed by Dr. Simmons with patient.\par Recommend dexamethasone taper (patient was not written for taper by ED - will rewrite dexamethasone taper over 7 days). Instructed to take with Pepcid daily and to take steroids with food.\par Recommend vestibular therapy for persistent dizziness/imbalance - Rx provided.\par Return in one month to evaluate progress.\par Instructions regarding steroid taper written down. Teachback method implemented.\par \par Education provided regarding plan of care. \par Patient verbalizes agreement and understanding with plan.

## 2020-02-04 NOTE — HISTORY OF PRESENT ILLNESS
[de-identified] : 48 year old woman with no pertinent past medical history referred by Dr. Kalia Wang for consideration of GKRS to RIGHT acoustic neuroma.\par \par She reports acute RIGHT sided hearing loss on 9/17/19 followed by persistent RIGHT ear tinnitus. She saw ENT, Dr. Graciela Mendez. Audiology testing was done which demonstrated significant hearing loss in the RIGHT ear.  She was subsequently placed on high dose steroids with interval improvement in hearing. She returned to see Dr. Mendez on 9/26/19, and another audiology test was administered which demonstrated improvement in hearing.\par \par Since this episode of acute hearing loss, Ms. Hahn also reports intermittent episodes of vertigo, dizziness and nausea as well as RIGHT sided posterior head "pressure". She states she continues to have persistent RIGHT ear tinnitus, which varies in intensity but feels her RIGHT sided hearing is markedly improved. Denies decreased hearing/tinnitus in LEFT ear.

## 2020-02-24 ENCOUNTER — APPOINTMENT (OUTPATIENT)
Dept: NEUROSURGERY | Facility: CLINIC | Age: 50
End: 2020-02-24

## 2020-03-05 ENCOUNTER — APPOINTMENT (OUTPATIENT)
Dept: OTOLARYNGOLOGY | Facility: CLINIC | Age: 50
End: 2020-03-05

## 2020-03-17 NOTE — REASON FOR VISIT
[FreeTextEntry1] : \par TODAY'S VISIT:\par Returns for follow up after completing steroid taper to re-evaluate symptoms.\par \par PREVIOUS VISIT:\par Following up today due to new symptoms - intermittent dizziness, RIGHT sided pressure like headache and imbalance.\par Was seen in ED for headache and CT head was done which was negative for acute abnormality.\par At today's visit, she states headache has resolved but reports persistent ear fullness in the RIGHT, dizziness, imbalance. She has been able to return to work.\par She is currently taking 6 mg dexamethasone daily.\par She saw ENT yesterday, Dr. Mendez and was recommended vestibular therapy.\par \par \par PREVIOUS OFFICE VISIT 12/9/19:\par Returns today to evaluate progress.\par She finished steroid taper and symptoms have significantly improved. She has returned to work. \par \par

## 2020-03-17 NOTE — HISTORY OF PRESENT ILLNESS
[de-identified] : 48 year old woman with no pertinent past medical history referred by Dr. Kalia Wang for consideration of GKRS to RIGHT acoustic neuroma.\par \par She reports acute RIGHT sided hearing loss on 9/17/19 followed by persistent RIGHT ear tinnitus. She saw ENT, Dr. Graciela Mendez. Audiology testing was done which demonstrated significant hearing loss in the RIGHT ear.  She was subsequently placed on high dose steroids with interval improvement in hearing. She returned to see Dr. Mendez on 9/26/19, and another audiology test was administered which demonstrated improvement in hearing.\par \par Since this episode of acute hearing loss, Ms. Hahn also reports intermittent episodes of vertigo, dizziness and nausea as well as RIGHT sided posterior head "pressure". She states she continues to have persistent RIGHT ear tinnitus, which varies in intensity but feels her RIGHT sided hearing is markedly improved. Denies decreased hearing/tinnitus in LEFT ear.

## 2020-03-20 ENCOUNTER — APPOINTMENT (OUTPATIENT)
Dept: NEUROSURGERY | Facility: CLINIC | Age: 50
End: 2020-03-20

## 2020-06-22 ENCOUNTER — APPOINTMENT (OUTPATIENT)
Dept: NEUROSURGERY | Facility: CLINIC | Age: 50
End: 2020-06-22
Payer: COMMERCIAL

## 2020-06-22 PROCEDURE — 99215 OFFICE O/P EST HI 40 MIN: CPT | Mod: 95

## 2020-06-22 RX ORDER — DEXAMETHASONE 2 MG/1
2 TABLET ORAL
Qty: 28 | Refills: 0 | Status: DISCONTINUED | COMMUNITY
Start: 2020-01-24 | End: 2020-06-22

## 2020-06-22 RX ORDER — DEXAMETHASONE 6 MG/1
TABLET ORAL
Refills: 0 | Status: DISCONTINUED | COMMUNITY
End: 2020-06-22

## 2020-06-22 RX ORDER — FAMOTIDINE 20 MG/1
20 TABLET, FILM COATED ORAL DAILY
Qty: 14 | Refills: 0 | Status: DISCONTINUED | COMMUNITY
Start: 2020-01-24 | End: 2020-06-22

## 2020-06-22 NOTE — ASSESSMENT
[FreeTextEntry1] : Symptoms are improving but she does report some mild imbalance and intermittent tinnitus.\par Recommend repeat MRI brain and IACs with and without contrast in 6 months - she will obtain MRI in September 2020 and will return to see Dr. Simmons to review imaging.\par \par Education provided regarding disease process and plan of care.\par \par Patient verbalizes agreement and understanding with plan.

## 2020-06-22 NOTE — REVIEW OF SYSTEMS
[As Noted in HPI] : as noted in HPI [Dizziness] : dizziness [Vertigo] : vertigo [Negative] : Heme/Lymph

## 2020-06-22 NOTE — REASON FOR VISIT
[Follow-Up: _____] : a [unfilled] follow-up visit [Spouse] : spouse [FreeTextEntry1] : \par TODAY'S VISIT:\par \par She is due for MRI brain and IACs with and without contrast to evaluate RIGHT vestibular schwannoma s/p GKRS on 11/5/19. Due to COVID 19 pandemic, she has asked to postpone MRI until September 2020.\par \par She comes today for continuation of care.\par \par She reports mild imbalance and intermittent RIGHT ear tinnitus. This has improved since previous visits in January.\par \par PREVIOUS VISIT 1/24/2020:\par Following up today due to new symptoms - intermittent dizziness, RIGHT sided pressure like headache and imbalance.\par Was seen in ED for headache and CT head was done which was negative for acute abnormality.\par At today's visit, she states headache has resolved but reports persistent ear fullness in the RIGHT, dizziness, imbalance. She has been able to return to work.\par She is currently taking 6 mg dexamethasone daily.\par She saw ENT yesterday, Dr. Mendez and was recommended vestibular therapy.\par \par PREVIOUS OFFICE VISIT 12/9/19:\par Returns today to evaluate progress.\par She finished steroid taper and symptoms have significantly improved. She has returned to work. \par \par PREVIOUS OFFICE VISIT 11/11/19:\par She comes to the appointment with her .\par \par She reports worse tinnitus beginning last week in the RIGHT ear as well as worsening imbalance. She was started on dexamethasone taper and she states that she does feel better but does continue to have balance trouble.\par \par She denies focal weakness, changes in speech/vision, nausea/vomiting.\par She reports sleep disturbance with increase in steroids.

## 2020-09-03 ENCOUNTER — OUTPATIENT (OUTPATIENT)
Dept: OUTPATIENT SERVICES | Facility: HOSPITAL | Age: 50
LOS: 1 days | End: 2020-09-03

## 2020-09-03 ENCOUNTER — APPOINTMENT (OUTPATIENT)
Dept: MRI IMAGING | Facility: CLINIC | Age: 50
End: 2020-09-03
Payer: COMMERCIAL

## 2020-09-03 PROCEDURE — 70553 MRI BRAIN STEM W/O & W/DYE: CPT | Mod: 26

## 2020-09-10 ENCOUNTER — APPOINTMENT (OUTPATIENT)
Dept: NEUROSURGERY | Facility: CLINIC | Age: 50
End: 2020-09-10
Payer: COMMERCIAL

## 2020-09-10 PROCEDURE — 99215 OFFICE O/P EST HI 40 MIN: CPT | Mod: 95

## 2020-09-10 NOTE — REASON FOR VISIT
[Follow-Up: _____] : a [unfilled] follow-up visit [Spouse] : spouse [FreeTextEntry1] : \par TODAY'S VISIT:\par \par Returns to review MRI brain with and without contrast to evaluate stability of acoustic neuroma s/p GKRS on 11/5/19.\par \par She reports improvement in dizziness and vestibular symptoms.\par She reports muffled hearing in RIGHT ear and occasional tinnitus but tinnitus has somewhat improved.\par She has returned to work and is able to perform her routine daily activities without difficulty. \par \par PREVIOUS OFFICE VISIT 1/24/2020:\par Following up today due to new symptoms - intermittent dizziness, RIGHT sided pressure like headache and imbalance.\par Was seen in ED for headache and CT head was done which was negative for acute abnormality.\par At today's visit, she states headache has resolved but reports persistent ear fullness in the RIGHT, dizziness, imbalance. She has been able to return to work.\par She is currently taking 6 mg dexamethasone daily.\par She saw ENT yesterday, Dr. Mendez and was recommended vestibular therapy.\par \par PREVIOUS OFFICE VISIT 12/9/19:\par Returns today to evaluate progress.\par She finished steroid taper and symptoms have significantly improved. She has returned to work. \par \par PREVIOUS OFFICE VISIT 11/11/19:\par She comes to the appointment with her .\par \par She reports worse tinnitus beginning last week in the RIGHT ear as well as worsening imbalance. She was started on dexamethasone taper and she states that she does feel better but does continue to have balance trouble.\par \par She denies focal weakness, changes in speech/vision, nausea/vomiting.\par She reports sleep disturbance with increase in steroids.

## 2020-09-10 NOTE — HISTORY OF PRESENT ILLNESS
[Home] : at home, [unfilled] , at the time of the visit. [Medical Office: ([facility])___] : at the medical office located in  [Verbal consent obtained from patient] : the patient, [unfilled] [de-identified] : 48 year old woman with no pertinent past medical history referred by Dr. Kalia Wang for consideration of GKRS to RIGHT acoustic neuroma.\par \par She reports acute RIGHT sided hearing loss on 9/17/19 followed by persistent RIGHT ear tinnitus. She saw ENT, Dr. Graciela Mendez. Audiology testing was done which demonstrated significant hearing loss in the RIGHT ear.  She was subsequently placed on high dose steroids with interval improvement in hearing. She returned to see Dr. Mendez on 9/26/19, and another audiology test was administered which demonstrated improvement in hearing.\par \par Since this episode of acute hearing loss, Ms. Hahn also reports intermittent episodes of vertigo, dizziness and nausea as well as RIGHT sided posterior head "pressure". She states she continues to have persistent RIGHT ear tinnitus, which varies in intensity but feels her RIGHT sided hearing is markedly improved. Denies decreased hearing/tinnitus in LEFT ear.

## 2020-09-10 NOTE — ASSESSMENT
[FreeTextEntry1] : MRI brain with and without contrast from 9/3/2020 reviewed by Dr. Simmons demonstrates RIGHT acoustic neuroma with slight interval increase in size.\par Patient's vestibular symptoms have improved.\par Recommend MRI brain with and without contrast in one year (September 2021). Patient will call in August 2021 to schedule.\par All questions answered to patient's satisfaction.\par \par Patient verbalizes agreement and understanding with plan of care.\par

## 2020-09-10 NOTE — DATA REVIEWED
[de-identified] : EXAM: MR IAC ONLY WAW IC \par \par PROCEDURE DATE: 09/03/2020 \par \par \par \par \par INTERPRETATION: Sagittal, axial and coronal imaging of the brain was performed with attention to the internal auditory canals bilaterally. T1 and T2 weighted sequences were acquired both before and following intravenous contrast administration, including volumetric and fat-suppressed sequences. \par \par Contrast dose: 6 cc of intravenous Gadavist. \par \par Clinical information: Status post gamma knife radiosurgery for right acoustic neuroma of 11/5/2019. \par \par The current study is compared with previous scan dated 10/4/2019. \par \par There is again evident enhancing mass that extends from the right porus acusticus to the IAC fundus most compatible with vestibular schwannoma. The mass currently demonstrates a maximal dimension of 13 mm, previously 11 mm, based on multiplanar and multisequential measurements. A very small component of the mass now extends into the lateral aspect of the cerebellopontine angle cistern, without contact to the brain. There is again abnormal hyperintensity within the cochlea on the FLAIR images, without corresponding contrast enhancement. The ventricles, sulci and cisterns remain normal in caliber for the patient's age without additional intracranial mass or pathologic contrast enhancement. 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 \par \par IMPRESSION: \par \par There has been slight increase in maximal dimension of right vestibular schwannoma, likely within the measurement margin of error. \par \par \par \par \par \par \par COLETTE BHAT M.D., ATTENDING RADIOLOGIST \par This document has been electronically signed. Sep 3 2020 2:14PM

## 2021-06-23 ENCOUNTER — APPOINTMENT (OUTPATIENT)
Dept: OTOLARYNGOLOGY | Facility: CLINIC | Age: 51
End: 2021-06-23
Payer: COMMERCIAL

## 2021-06-23 PROCEDURE — 99212 OFFICE O/P EST SF 10 MIN: CPT

## 2021-06-23 PROCEDURE — 92567 TYMPANOMETRY: CPT

## 2021-06-23 PROCEDURE — 92557 COMPREHENSIVE HEARING TEST: CPT

## 2021-06-23 RX ORDER — FLUTICASONE PROPIONATE 50 MCG
SPRAY, SUSPENSION NASAL
Refills: 0 | Status: DISCONTINUED | COMMUNITY
End: 2021-06-23

## 2021-06-23 NOTE — ASSESSMENT
[FreeTextEntry1] : She has been doing well since treatment for the right acoustic neuroma. She still has some mild dizziness and dysequilibrium. She has had more tinnitus, headaches, and muffled hearing on the right side. It is unclear if this could be related to the acoustic neuroma, treatment for the acoustic neuroma, or other sources such as TMJ dysfunction or possibly migraines. Audiogram showed a mild sensorineural hearing loss on the right side. \par \par PLAN\par \par -findings and management options discussed with the patient. \par - good aural hygiene\par - noise precautions\par - repeat audiogram in 6 months\par - vestibular therapy as needed. \par - continue with treatment for TMJD\par - I asked her to followup with Dr. Simmons regarding her current symptoms. She is due for repeat MRI in the fall. \par - I asked her to call me and give me an update on her condition. If she is doing well, I will see her back in 6 months\par - Call or return earlier if any worsening symptoms or concerns\par

## 2021-06-23 NOTE — HISTORY OF PRESENT ILLNESS
[de-identified] : LU NELSON is a 50 year patient who underwent gamma knife radiosurgery for a right acoustic neuroma on 11/5/19. She has noticed tinnitus, headaches, and muffled hearing on the right side. She feels that changes in the weather make her symptoms worse. She has no otorrhea. The dizziness and balance issues have improved although she still has some trouble. She does suffer from TMJ dysfunction. She uses a nightguard.\par \par ENT History\par She had gamma knife radiosurgery 11/5/19. \par MRI 9/2020- slight increase in maximal dimension of right vestibular schwannoma, likely within the margin of error\par MRI 10/4/19- 1.0 x 0.3 cm right acoustic neuroma. \par She had TMJD and uses a nightguard\par CT scan head 1/2020- unremarkable

## 2021-09-01 ENCOUNTER — APPOINTMENT (OUTPATIENT)
Dept: MRI IMAGING | Facility: CLINIC | Age: 51
End: 2021-09-01
Payer: COMMERCIAL

## 2021-09-01 ENCOUNTER — RESULT REVIEW (OUTPATIENT)
Age: 51
End: 2021-09-01

## 2021-09-01 ENCOUNTER — OUTPATIENT (OUTPATIENT)
Dept: OUTPATIENT SERVICES | Facility: HOSPITAL | Age: 51
LOS: 1 days | End: 2021-09-01

## 2021-09-01 PROCEDURE — 70553 MRI BRAIN STEM W/O & W/DYE: CPT | Mod: 26

## 2021-09-07 ENCOUNTER — APPOINTMENT (OUTPATIENT)
Dept: NEUROSURGERY | Facility: CLINIC | Age: 51
End: 2021-09-07
Payer: COMMERCIAL

## 2021-09-07 PROCEDURE — 99214 OFFICE O/P EST MOD 30 MIN: CPT | Mod: 95

## 2021-09-08 NOTE — DATA REVIEWED
[de-identified] : \par  MR IAC w/wo IV Cont             Final\par \par No Documents Attached\par \par \par \par \par   EXAM:  MR IAC ONLY [facility] IC\par \par PROCEDURE DATE:  09/01/2021\par \par \par \par \par INTERPRETATION:  Sagittal, axial and coronal imaging of the brain was performed with attention to the internal auditory canals bilaterally.  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 Evaluate stability.\par \par The current study is compared with previous scan dated 9/3/2020.\par \par There has been slight interval increase in volume of right vestibular schwannoma most evident along its medial margin where there is now a small spherical component with a diameter of approximate 5 mm that projects into the lateral aspect of the CP angle cistern. Overall length of the tumor has increased from 13 to 14 mm. There remains no evidence of brainstem or cerebellar compression. There remains no evidence of pathologic enhancement within the membranous labyrinth, with persistent elevated FLAIR signal compatible with elevated protein content.\par \par There is again evidence of a right posterior frontal developmental venous anomaly. There remains no evidence of additional intracranial mass or pathologic contrast enhancement. There are again scattered foci of T2 shortening in the cerebral white matter that are nonspecific. Paranasal sinuses and mastoids remain clear.\par \par IMPRESSION:\par \par Slight interval increase in volume of tumor in the lateral right cerebellopontine angle cistern, though there remains no evidence of brainstem or cerebellar compression.\par \par --- End of Report ---\par \par \par \par \par \par \par COLETTE BHAT MD; Attending Radiologist\par This document has been electronically signed. Sep  5 2021 10:21AM\par \par  \par \par  Ordered by: EUGENE GONZALEZ       Collected/Examined: 01Sep2021 05:03PM       \par Verification Required       Stage: Final       \par  Performed at: [facility]       Resulted: [id number] 10:04AM       Last Updated: 05Sep2021 10:25AM       Accession: [id number]

## 2021-09-08 NOTE — ASSESSMENT
[FreeTextEntry1] : MRI brain/IACs with and without contrast from 9/1/21 reviewed by Dr. Simmons with patient demonstrates slight increase in right CPA mass.\par Recommend repeat MRI brain in 6 months (January/February 2022). patient will call in January 2022 to schedule.\par \par Patient verbalizes agreement and understanding with plan of care.\par \par I, Dr. Simmons, personally performed the evaluation and management (E/M) services for this established patient who presents today with (a) new problem(s)/exacerbation of (an) existing condition(s).  That E/M includes conducting the examination, assessing all new/exacerbated conditions, and establishing a new plan of care.  Today, my ACP, Belle Dsouza, was here to observe my evaluation and management services for this new problem/exacerbated condition to be followed going forward.\par \par

## 2021-09-08 NOTE — HISTORY OF PRESENT ILLNESS
[Home] : at home, [unfilled] , at the time of the visit. [Medical Office: ([facility])___] : at the medical office located in  [Verbal consent obtained from patient] : the patient, [unfilled] [de-identified] : 48 year old woman with no pertinent past medical history referred by Dr. Kalia Wang for consideration of GKRS to RIGHT acoustic neuroma.\par \par She reports acute RIGHT sided hearing loss on 9/17/19 followed by persistent RIGHT ear tinnitus. She saw ENT, Dr. Graciela Mendez. Audiology testing was done which demonstrated significant hearing loss in the RIGHT ear.  She was subsequently placed on high dose steroids with interval improvement in hearing. She returned to see Dr. Mendez on 9/26/19, and another audiology test was administered which demonstrated improvement in hearing.\par \par Since this episode of acute hearing loss, Ms. Hahn also reports intermittent episodes of vertigo, dizziness and nausea as well as RIGHT sided posterior head "pressure". She states she continues to have persistent RIGHT ear tinnitus, which varies in intensity but feels her RIGHT sided hearing is markedly improved. Denies decreased hearing/tinnitus in LEFT ear.\par \par She is s/p GKRS to RIGHT vestibular schwannoma on 11/5/19.\par \par MRI brain with and without contrast on 9/1/20 to evaluate stability of acoustic neuroma s/p GKRS on 11/5/19.\par \par She was recommended MRI brain in one year and had MRI brain done on 9/1/21, which she returns today to review. \par \par She reports intermittent dizziness, decreased hearing and tinnitus triggered by hot weather.

## 2021-09-27 ENCOUNTER — TRANSCRIPTION ENCOUNTER (OUTPATIENT)
Age: 51
End: 2021-09-27

## 2021-12-16 ENCOUNTER — APPOINTMENT (OUTPATIENT)
Dept: OTOLARYNGOLOGY | Facility: CLINIC | Age: 51
End: 2021-12-16

## 2022-03-10 PROBLEM — R26.89 IMBALANCE: Status: ACTIVE | Noted: 2020-01-24

## 2022-03-14 ENCOUNTER — APPOINTMENT (OUTPATIENT)
Dept: NEUROSURGERY | Facility: CLINIC | Age: 52
End: 2022-03-14
Payer: COMMERCIAL

## 2022-03-14 DIAGNOSIS — R26.89 OTHER ABNORMALITIES OF GAIT AND MOBILITY: ICD-10-CM

## 2022-03-14 PROCEDURE — 99215 OFFICE O/P EST HI 40 MIN: CPT | Mod: 95

## 2022-03-14 NOTE — PHYSICAL EXAM
[General Appearance - Alert] : alert [General Appearance - In No Acute Distress] : in no acute distress [General Appearance - Well-Appearing] : healthy appearing [Oriented To Time, Place, And Person] : oriented to person, place, and time [Impaired Insight] : insight and judgment were intact [Affect] : the affect was normal [Memory Recent] : recent memory was not impaired [Neck Appearance] : the appearance of the neck was normal [] : no respiratory distress [Respiration, Rhythm And Depth] : normal respiratory rhythm and effort [Skin Color & Pigmentation] : normal skin color and pigmentation

## 2022-03-17 ENCOUNTER — APPOINTMENT (OUTPATIENT)
Dept: OTOLARYNGOLOGY | Facility: CLINIC | Age: 52
End: 2022-03-17
Payer: COMMERCIAL

## 2022-03-17 VITALS — TEMPERATURE: 97.3 F | BODY MASS INDEX: 24.8 KG/M2 | WEIGHT: 140 LBS | HEIGHT: 63 IN

## 2022-03-17 DIAGNOSIS — M26.609 UNSPECIFIED TEMPOROMANDIBULAR JOINT DISORDER: ICD-10-CM

## 2022-03-17 DIAGNOSIS — H90.41 SENSORINEURAL HEARING LOSS, UNILATERAL, RIGHT EAR, WITH UNRESTRICTED HEARING ON THE CONTRALATERAL SIDE: ICD-10-CM

## 2022-03-17 DIAGNOSIS — H93.291 OTHER ABNORMAL AUDITORY PERCEPTIONS, RIGHT EAR: ICD-10-CM

## 2022-03-17 PROCEDURE — 92557 COMPREHENSIVE HEARING TEST: CPT

## 2022-03-17 PROCEDURE — 99213 OFFICE O/P EST LOW 20 MIN: CPT

## 2022-03-17 PROCEDURE — 92567 TYMPANOMETRY: CPT

## 2022-03-17 NOTE — HISTORY OF PRESENT ILLNESS
[de-identified] : LU NELSON is a 51 year old patient here for follow up for a right acoustic neuroma treated with gamma knife radiosurgery by Dr. Simmons on 11/5/19.  She has been doing well.  She has mild tinnitus and occasional muffled hearing. She has occasional imbalance. For the past 3 weeks, she has had intermittent right ear fullness.  She had a recent MRI for follow up.  She has no nasal or throat symptoms. She uses a nightguard for TMJD. \par \par ENT History\par She had gamma knife radiosurgery 11/5/19. \par MRI 3/8/22- 12 x 4 mm\par MRI 9/5/21- 14 x 5 mm with slight increase\par MRI 9/2020- slight increase in maximal dimension of right vestibular schwannoma, likely within the margin of error\par MRI 10/4/19- 1.0 x 0.3 cm right acoustic neuroma. \par She had TMJD and uses a nightguard\par CT scan head 1/2020- unremarkable

## 2022-03-17 NOTE — ASSESSMENT
[FreeTextEntry1] : She continues to do well.  She has intermittent mild tinnitus and muffled hearing on the right side.  For the past 3 weeks, she has had intermittent pressure as well.  Her ear exam was normal.  Audiogram showed no change in her hearing.  It is essentially normal with a mild sensorineural hearing loss at 250 Hz in the right ear.  She had normal tympanograms.  We discussed possible etiologies such as eustachian tube dysfunction and TMJ dysfunction.  It is possible her symptoms could be related to the acoustic neuroma and/or treatment.  We also discussed other possible causes such as migraines.\par \par PLAN\par \par -findings and management options discussed with the patient. \par - good aural hygiene\par - noise precautions\par - monitor hearing\par - continue with treatment for TMJD\par -She may use a nasal steroid spray and/or decongestant if she has symptoms of eustachian tube dysfunction.  \par -She may consider evaluation for migraines\par - I will see her back in approximately 6 months if she is doing well.-\par - Call or return earlier if any worsening symptoms or concerns\par

## 2022-03-18 NOTE — ASSESSMENT
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 Simmons with patient, which demonstrates decrease in size of vestibular schwannoma. \par \par Recommending patient repeat MRI with IAC w/wo contrast in 6 months (Sept 2022). She should RTC after completion to review, sooner prn. \par She should also see ENT in the interim. \par \par Patient verbalizes agreement and understanding with plan of care. \par \par I, Dr. Simmons, personally performed the evaluation and management (E/M) services for this established patient who presents today with (a) new problem(s)/exacerbation of (an) existing condition(s). That E/M includes conducting the examination, assessing all new/exacerbated conditions, and establishing a new plan of care. Today, my ACP, Felicia Jung, was here to observe my evaluation and management services for this new problem/exacerbated condition to be followed going forward. \par

## 2022-03-18 NOTE — DATA REVIEWED
[de-identified] : SITE PERFORMED: [facility]\par SITE PHONE: [phone]\par Patient: LU NELSON\par YOB: 1970\par Phone: [phone]\par MRN: [id number] Acc: [id number]\par Date of Exam: 03-\par  \par EXAM:  MR BRAIN AND IAC WITHOUT AND WITH CONTRAST\par \par HISTORY: Right acoustic neuroma. Status post gamma knife treatment.\par \par TECHNIQUE: Multiple multiplanar pulse sequences were obtained without intravenous contrast including thin section FIESTA axial images with coronal through the internal auditory canals. Images were acquired on a 1.5 Crystal MRI. \par \par FINDINGS:\par \par \par There are no previous studies for comparison. \par \par There is a lesion in the right the canal slightly projecting through the porus acusticus into the right cerebellopontine angle measuring 12 x 4 mm. The left internal auditory canal and the 7th and 8th nerves are unremarkable. There is no aberrant vascular loop.\par \par Ventricular and sulcal size are normal. There are a few punctate foci of increased FLAIR signal in the right parietal white matter that are nonspecific. There is no intraaxial or extraaxial mass. There is no vascular malformation. No restricted fluid motion is seen. There is no abnormality at the skull base or craniovertebral junction.  \par \par There is no intracanalicular or cerebellopontine angle lesion. There is no abnormal signal in the cerebellum or brainstem. There is no abnormal signal or abnormal enhancement in the petrous bones. The visualized paranasal sinuses and mastoid air cells demonstrate normal signal void.\par \par IMPRESSION: \par \par Findings consistent with a right acoustic schwannoma measuring 12 x 4 mm.\par \par Thank you for the opportunity to participate in the care of this patient.  \par

## 2022-03-18 NOTE — REASON FOR VISIT
[Follow-Up: _____] : a [unfilled] follow-up visit [Home] : at home, [unfilled] , at the time of the visit. [Medical Office: ([facility])___] : at the medical office located in  [Verbal consent obtained from patient] : the patient, [unfilled] [FreeTextEntry1] : s/p GKRS to RIGHT vestibular schwannoma on 11/5/19\par

## 2022-03-18 NOTE — REVIEW OF SYSTEMS
[As Noted in HPI] : as noted in HPI [Fever] : no fever [Chills] : no chills [Eyesight Problems] : no eyesight problems [Chest Pain] : no chest pain [Palpitations] : no palpitations [Shortness Of Breath] : no shortness of breath [Cough] : no cough

## 2022-03-18 NOTE — HISTORY OF PRESENT ILLNESS
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 Kalia Wang for consideration of GKRS to RIGHT acoustic neuroma.\par She reports acute RIGHT sided hearing loss on 9/17/19 followed by persistent RIGHT ear tinnitus. She saw ENT, Dr. Graciela Mendez. Audiology testing was done which demonstrated significant hearing loss in the RIGHT ear. She was subsequently placed on high dose steroids with interval improvement in hearing. She returned to see Dr. Mendez on 9/26/19, and another audiology test was administered which demonstrated improvement in hearing.\par \par Since this episode of acute hearing loss, Ms. Hahn also reports intermittent episodes of vertigo, dizziness and nausea as well as RIGHT sided posterior head "pressure". She states she continues to have persistent RIGHT ear tinnitus, which varies in intensity but feels her RIGHT sided hearing is markedly improved. Denies decreased hearing/tinnitus in LEFT ear.\par \par She underwent GKRS to RIGHT vestibular schwannoma on 11/5/19.\par \par MRI brain with and without contrast from 9/3/2020 demonstrated RIGHT acoustic neuroma with slight interval increase in size. Recommended MRI brain with and without contrast in one year. \par \par \par MRI brain/IACs with and without contrast from 9/1/21 demonstrated slight increase in right CPA mass.\par Recommend repeat MRI brain in 6 months. \par \par TODAY:\par Patient presents after completing MRI brain/IACs to review. \par Today patient states that she is overall feeling well. Denies seizure activity, visual changes, bowel/bladder issues, or new/worsening focal neuro deficits. Endorses ear pressure.\par

## 2022-03-21 PROBLEM — H90.41 SENSORINEURAL HEARING LOSS (SNHL) OF RIGHT EAR WITH UNRESTRICTED HEARING OF LEFT EAR: Status: ACTIVE | Noted: 2021-06-23

## 2022-08-23 ENCOUNTER — RESULT REVIEW (OUTPATIENT)
Age: 52
End: 2022-08-23

## 2022-08-23 ENCOUNTER — APPOINTMENT (OUTPATIENT)
Dept: MRI IMAGING | Facility: CLINIC | Age: 52
End: 2022-08-23

## 2022-08-23 ENCOUNTER — OUTPATIENT (OUTPATIENT)
Dept: OUTPATIENT SERVICES | Facility: HOSPITAL | Age: 52
LOS: 1 days | End: 2022-08-23

## 2022-08-23 PROCEDURE — 70553 MRI BRAIN STEM W/O & W/DYE: CPT | Mod: 26

## 2022-08-29 ENCOUNTER — APPOINTMENT (OUTPATIENT)
Dept: NEUROSURGERY | Facility: CLINIC | Age: 52
End: 2022-08-29

## 2022-08-31 ENCOUNTER — APPOINTMENT (OUTPATIENT)
Dept: NEUROSURGERY | Facility: CLINIC | Age: 52
End: 2022-08-31

## 2022-08-31 DIAGNOSIS — Z92.3 PERSONAL HISTORY OF IRRADIATION: ICD-10-CM

## 2022-08-31 DIAGNOSIS — D33.3 BENIGN NEOPLASM OF CRANIAL NERVES: ICD-10-CM

## 2022-08-31 PROCEDURE — 99214 OFFICE O/P EST MOD 30 MIN: CPT | Mod: 95

## 2022-09-02 NOTE — HISTORY OF PRESENT ILLNESS
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 Kalia Wang for consideration of GKRS to RIGHT acoustic neuroma.\par She reports acute RIGHT sided hearing loss on 9/17/19 followed by persistent RIGHT ear tinnitus. She saw ENT, Dr. Graciela Mendez. Audiology testing was done which demonstrated significant hearing loss in the RIGHT ear. She was subsequently placed on high dose steroids with interval improvement in hearing. She returned to see Dr. Mendez on 9/26/19, and another audiology test was administered which demonstrated improvement in hearing.\par \par Since this episode of acute hearing loss, Ms. Hahn also reports intermittent episodes of vertigo, dizziness and nausea as well as RIGHT sided posterior head "pressure". She states she continues to have persistent RIGHT ear tinnitus, which varies in intensity but feels her RIGHT sided hearing is markedly improved. Denies decreased hearing/tinnitus in LEFT ear.\par \par She underwent GKRS to RIGHT vestibular schwannoma on 11/5/19.\par \par Last MRI brain/IACs was done on 3/8/22 which was stable.\par \par She returns today to review MRI brain/IACs done on 8/23/22 which demonstrates stable right acoustic neuroma. She continues to report decreased hearing in RIGHT ear and tinnitus. Denies imbalance/dizziness.\par

## 2022-09-02 NOTE — ASSESSMENT
[FreeTextEntry1] : MRI brain/IACs with and without contrast from 8/23/22 reviewed by Dr. Simmons with patient, demonstrates stable right acoustic neuroma.\par Recommend repeat MRI brain/IACs with and without contrast in 2 years (August 2024). Patient will call the office in July 2024 to schedule\par \par Patient verbalizes agreement and understanding with plan of care.\par \par I, Dr. Simmons, personally performed the evaluation and management (E/M) services for this established patient who presents today with (a) new problem(s)/exacerbation of (an) existing condition(s).  That E/M includes conducting the examination, assessing all new/exacerbated conditions, and establishing a new plan of care.  Today, my ACP, Belle Dsouza, was here to observe my evaluation and management services for this new problem/exacerbated condition to be followed going forward.\par \par

## 2022-11-03 ENCOUNTER — APPOINTMENT (OUTPATIENT)
Dept: OTOLARYNGOLOGY | Facility: CLINIC | Age: 52
End: 2022-11-03

## 2022-11-03 VITALS — TEMPERATURE: 97.2 F | HEIGHT: 63 IN | WEIGHT: 135 LBS | BODY MASS INDEX: 23.92 KG/M2

## 2022-11-03 DIAGNOSIS — R42 DIZZINESS AND GIDDINESS: ICD-10-CM

## 2022-11-03 DIAGNOSIS — J32.8 OTHER CHRONIC SINUSITIS: ICD-10-CM

## 2022-11-03 DIAGNOSIS — J34.89 OTHER SPECIFIED DISORDERS OF NOSE AND NASAL SINUSES: ICD-10-CM

## 2022-11-03 PROCEDURE — 99213 OFFICE O/P EST LOW 20 MIN: CPT | Mod: 25

## 2022-11-03 PROCEDURE — 31231 NASAL ENDOSCOPY DX: CPT

## 2022-11-03 NOTE — ASSESSMENT
[FreeTextEntry1] : She has had nasal congestion since Saturday.  She also has significant sinus pressure, facial pain, intermittent ear pain and vertigo.  She has a history of mild allergies.  Nasal endoscopy showed a deviated septum and minimal stringy clear mucus which was cultured.  I suspect her symptoms are not due to her sinuses.  MRI in August did not show sinus inflammation.  I spoke with her about other possible etiologies including TMJ dysfunction, atypical migraines, and symptoms related to the acoustic neuroma and/were treatment.\par \par Plan\par -Findings and management options were discussed with the patient.\par -Nasal saline irrigations as needed\par -She may use Afrin nasal spray for 3 to 5 days\par -I asked her to start using the nasal steroid spray daily\par -OTC decongestant as needed\par -Continue treatment of TMJ dysfunction\par -I asked her to consider speaking with her neurologist or neurosurgeon about the symptoms\par -She may consider CT scan of the sinuses if her symptoms persist.  She does not wish to proceed with that at this time\par -She declined an audiogram to check for a change in her hearing.  We will continue to monitor it\par -She will call on Monday for the culture results.  I will see how she is feeling and we discussed the neck step in treatment\par -She should call and return earlier if any concerns or worsening symptoms\par \par

## 2022-11-03 NOTE — HISTORY OF PRESENT ILLNESS
[de-identified] : LU NELSON is a 51 year old patient with a history of gamma knife radiosurgery for a right acoustic neuroma in November 2019 here for a possible sinus infection.  She says she has a history of chronic sinus disease.  On Saturday, she developed nasal congestion.  She has sinus pain and pressure.  She also has ear fullness.  She also reports mild vertigo.  She is not having a lot of nasal drainage.  She has been using Afrin and decongestants.  The decongestants have helped a little bit.  She does have TMJ dysfunction and uses a nightguard.  She had an MRI in August which showed a stable acoustic neuroma.  The sinuses were clear.\par \par ENT history \par Gamma knife radiosurgery for right acoustic neuroma November 5, 2019 with Dr. Simmons\par She has TMJ dysfunction and uses a nightguard\par CT scan of the head January 2020 was unremarkable\par MRI August 2022 showed stable right acoustic neuroma and clear sinuses

## 2022-11-11 ENCOUNTER — NON-APPOINTMENT (OUTPATIENT)
Age: 52
End: 2022-11-11

## 2022-11-11 LAB — EAR NOSE AND THROAT CULTURE: ABNORMAL

## 2024-10-06 NOTE — HISTORY OF PRESENT ILLNESS
[de-identified] : 48 year old woman with no pertinent past medical history referred by Dr. Kalia Wang for consideration of GKRS to RIGHT acoustic neuroma.\par \par She reports acute RIGHT sided hearing loss on 9/17/19 followed by persistent RIGHT ear tinnitus. She saw ENT, Dr. Graciela Mendez. Audiology testing was done which demonstrated significant hearing loss in the RIGHT ear.  She was subsequently placed on high dose steroids with interval improvement in hearing. She returned to see Dr. Mendez on 9/26/19, and another audiology test was administered which demonstrated improvement in hearing.\par \par Since this episode of acute hearing loss, Ms. Hahn also reports intermittent episodes of vertigo, dizziness and nausea as well as RIGHT sided posterior head "pressure". She states she continues to have persistent RIGHT ear tinnitus, which varies in intensity but feels her RIGHT sided hearing is markedly improved. Denies decreased hearing/tinnitus in LEFT ear.
show

## 2025-08-12 ENCOUNTER — APPOINTMENT (OUTPATIENT)
Dept: MRI IMAGING | Facility: CLINIC | Age: 55
End: 2025-08-12
Payer: COMMERCIAL

## 2025-08-12 ENCOUNTER — OUTPATIENT (OUTPATIENT)
Dept: OUTPATIENT SERVICES | Facility: HOSPITAL | Age: 55
LOS: 1 days | End: 2025-08-12

## 2025-08-12 ENCOUNTER — RESULT REVIEW (OUTPATIENT)
Age: 55
End: 2025-08-12

## 2025-08-12 PROCEDURE — 70553 MRI BRAIN STEM W/O & W/DYE: CPT | Mod: 26
